# Patient Record
Sex: FEMALE | Race: WHITE | Employment: OTHER | URBAN - METROPOLITAN AREA
[De-identification: names, ages, dates, MRNs, and addresses within clinical notes are randomized per-mention and may not be internally consistent; named-entity substitution may affect disease eponyms.]

---

## 2019-06-18 ENCOUNTER — HOSPITAL ENCOUNTER (OUTPATIENT)
Age: 54
Setting detail: OUTPATIENT SURGERY
Discharge: HOME | End: 2019-06-18
Attending: SPECIALIST | Admitting: SPECIALIST
Payer: COMMERCIAL

## 2019-06-18 ENCOUNTER — ANESTHESIA (OUTPATIENT)
Dept: ENDOSCOPY | Age: 54
End: 2019-06-18
Payer: COMMERCIAL

## 2019-06-18 ENCOUNTER — ANESTHESIA EVENT (OUTPATIENT)
Dept: ENDOSCOPY | Age: 54
End: 2019-06-18
Payer: COMMERCIAL

## 2019-06-18 VITALS
HEIGHT: 62 IN | RESPIRATION RATE: 11 BRPM | TEMPERATURE: 97.7 F | SYSTOLIC BLOOD PRESSURE: 143 MMHG | BODY MASS INDEX: 42.23 KG/M2 | DIASTOLIC BLOOD PRESSURE: 90 MMHG | WEIGHT: 229.5 LBS | OXYGEN SATURATION: 100 % | HEART RATE: 61 BPM

## 2019-06-18 PROCEDURE — 76040000019: Performed by: SPECIALIST

## 2019-06-18 PROCEDURE — 74011250636 HC RX REV CODE- 250/636: Performed by: PHYSICIAN ASSISTANT

## 2019-06-18 PROCEDURE — 77030013992 HC SNR POLYP ENDOSC BSC -B: Performed by: SPECIALIST

## 2019-06-18 PROCEDURE — 76060000031 HC ANESTHESIA FIRST 0.5 HR: Performed by: SPECIALIST

## 2019-06-18 PROCEDURE — 88305 TISSUE EXAM BY PATHOLOGIST: CPT

## 2019-06-18 PROCEDURE — 74011250636 HC RX REV CODE- 250/636

## 2019-06-18 RX ORDER — DEXTROMETHORPHAN/PSEUDOEPHED 2.5-7.5/.8
1.2 DROPS ORAL
Status: DISCONTINUED | OUTPATIENT
Start: 2019-06-18 | End: 2019-06-19 | Stop reason: HOSPADM

## 2019-06-18 RX ORDER — PROPOFOL 10 MG/ML
INJECTION, EMULSION INTRAVENOUS AS NEEDED
Status: DISCONTINUED | OUTPATIENT
Start: 2019-06-18 | End: 2019-06-18 | Stop reason: HOSPADM

## 2019-06-18 RX ORDER — LIDOCAINE HYDROCHLORIDE 20 MG/ML
INJECTION, SOLUTION EPIDURAL; INFILTRATION; INTRACAUDAL; PERINEURAL AS NEEDED
Status: DISCONTINUED | OUTPATIENT
Start: 2019-06-18 | End: 2019-06-18 | Stop reason: HOSPADM

## 2019-06-18 RX ORDER — SODIUM CHLORIDE 9 MG/ML
50 INJECTION, SOLUTION INTRAVENOUS CONTINUOUS
Status: DISCONTINUED | OUTPATIENT
Start: 2019-06-18 | End: 2019-06-18 | Stop reason: HOSPADM

## 2019-06-18 RX ORDER — MIDAZOLAM HYDROCHLORIDE 1 MG/ML
.25-5 INJECTION, SOLUTION INTRAMUSCULAR; INTRAVENOUS AS NEEDED
Status: DISCONTINUED | OUTPATIENT
Start: 2019-06-18 | End: 2019-06-19 | Stop reason: HOSPADM

## 2019-06-18 RX ORDER — MONTELUKAST SODIUM 10 MG/1
TABLET ORAL DAILY
COMMUNITY

## 2019-06-18 RX ORDER — ATROPINE SULFATE 0.1 MG/ML
0.5 INJECTION INTRAVENOUS
Status: DISCONTINUED | OUTPATIENT
Start: 2019-06-18 | End: 2019-06-19 | Stop reason: HOSPADM

## 2019-06-18 RX ORDER — FLUMAZENIL 0.1 MG/ML
0.2 INJECTION INTRAVENOUS
Status: DISCONTINUED | OUTPATIENT
Start: 2019-06-18 | End: 2019-06-18 | Stop reason: HOSPADM

## 2019-06-18 RX ORDER — NALOXONE HYDROCHLORIDE 0.4 MG/ML
0.4 INJECTION, SOLUTION INTRAMUSCULAR; INTRAVENOUS; SUBCUTANEOUS
Status: DISCONTINUED | OUTPATIENT
Start: 2019-06-18 | End: 2019-06-18 | Stop reason: HOSPADM

## 2019-06-18 RX ORDER — PROPOFOL 10 MG/ML
INJECTION, EMULSION INTRAVENOUS
Status: DISCONTINUED | OUTPATIENT
Start: 2019-06-18 | End: 2019-06-18 | Stop reason: HOSPADM

## 2019-06-18 RX ORDER — EPINEPHRINE 0.1 MG/ML
1 INJECTION INTRACARDIAC; INTRAVENOUS
Status: DISCONTINUED | OUTPATIENT
Start: 2019-06-18 | End: 2019-06-19 | Stop reason: HOSPADM

## 2019-06-18 RX ORDER — FENTANYL CITRATE 50 UG/ML
25 INJECTION, SOLUTION INTRAMUSCULAR; INTRAVENOUS AS NEEDED
Status: DISCONTINUED | OUTPATIENT
Start: 2019-06-18 | End: 2019-06-19 | Stop reason: HOSPADM

## 2019-06-18 RX ADMIN — PROPOFOL 120 MCG/KG/MIN: 10 INJECTION, EMULSION INTRAVENOUS at 10:27

## 2019-06-18 RX ADMIN — PROPOFOL 40 MG: 10 INJECTION, EMULSION INTRAVENOUS at 10:32

## 2019-06-18 RX ADMIN — SODIUM CHLORIDE: 9 INJECTION, SOLUTION INTRAVENOUS at 10:25

## 2019-06-18 RX ADMIN — LIDOCAINE HYDROCHLORIDE 30 MG: 20 INJECTION, SOLUTION EPIDURAL; INFILTRATION; INTRACAUDAL; PERINEURAL at 10:28

## 2019-06-18 RX ADMIN — PROPOFOL 100 MG: 10 INJECTION, EMULSION INTRAVENOUS at 10:28

## 2019-06-18 RX ADMIN — PROPOFOL 30 MG: 10 INJECTION, EMULSION INTRAVENOUS at 10:34

## 2019-06-18 RX ADMIN — PROPOFOL 30 MG: 10 INJECTION, EMULSION INTRAVENOUS at 10:31

## 2019-06-18 NOTE — PERIOP NOTES
Received Report From Ton Armendariz CRNA @ [id number], see anesthesia notes. Care of the patient transferred to procedure nurse Skylar Farley RN @ [phone]    Out of Procedure and sent to post-recovery @ [phone] report given to Santa Morrow RN @ 2901    Patient ABD remains soft and non-tender post procedure. Pt has no complaints at this time and tolerated the procedure well. Endoscope was pre-cleaned at bedside immediately following procedure by Gisele Marshall.

## 2019-06-18 NOTE — ANESTHESIA PREPROCEDURE EVALUATION
Relevant Problems   No relevant active problems       Anesthetic History   No history of anesthetic complications            Review of Systems / Medical History  Patient summary reviewed and pertinent labs reviewed    Pulmonary        Sleep apnea: CPAP    Asthma     Comments: Seasonal allergies   Neuro/Psych   Within defined limits           Cardiovascular                  Exercise tolerance: >4 METS     GI/Hepatic/Renal  Within defined limits              Endo/Other        Morbid obesity and arthritis     Other Findings              Physical Exam    Airway  Mallampati: II  TM Distance: 4 - 6 cm  Neck ROM: normal range of motion   Mouth opening: Normal     Cardiovascular    Rhythm: regular  Rate: normal         Dental    Dentition: Lower dentition intact and Upper partial plate     Pulmonary  Breath sounds clear to auscultation               Abdominal         Other Findings            Anesthetic Plan    ASA: 2  Anesthesia type: MAC          Induction: Intravenous  Anesthetic plan and risks discussed with: Patient

## 2019-06-18 NOTE — ANESTHESIA POSTPROCEDURE EVALUATION
Procedure(s):  COLONOSCOPY  ENDOSCOPIC POLYPECTOMY. MAC    Anesthesia Post Evaluation      Multimodal analgesia: multimodal analgesia used between 6 hours prior to anesthesia start to PACU discharge  Patient location during evaluation: bedside  Patient participation: complete - patient participated  Level of consciousness: awake  Pain management: adequate  Airway patency: patent  Anesthetic complications: no  Cardiovascular status: acceptable  Respiratory status: acceptable  Hydration status: acceptable        Vitals Value Taken Time   /90 6/18/2019 11:09 AM   Temp     Pulse 61 6/18/2019 11:09 AM   Resp 11 6/18/2019 11:09 AM   SpO2 100 % 6/18/2019 11:09 AM   Vitals shown include unvalidated device data.

## 2019-06-18 NOTE — ROUTINE PROCESS
Anabella Zaragoza  1965  [id number]    Situation:  Verbal report received from: Faith Taylor RN  Procedure: Procedure(s):  COLONOSCOPY  ENDOSCOPIC POLYPECTOMY    Background:    Preoperative diagnosis: FAM HX COLON CANCER  Postoperative diagnosis: Ascending colon polyps  descending colon polyp  sigmoid polyps  diverticulosis    :  Dr. Milton Menjivar  Assistant(s): Endoscopy Technician-1: Quinn De Los Santos  Endoscopy RN-1: Denise Rivas RN    Specimens:   ID Type Source Tests Collected by Time Destination   1 : Ascending colon polyps Preservative Colon, Ascending  Amadeo Almanzar MD 6/18/2019 1036 Pathology   2 : Descending colon polyp Preservative Colon, Descending  Amadeo Almanzar MD 6/18/2019 1040 Pathology   3 : Sigmoid polyps Preservative Sigmoid  Amadeo Almanzar MD 6/18/2019 1041 Pathology     H. Pylori  no    Assessment:  Intra-procedure medications   Anesthesia gave intra-procedure sedation and medications, see anesthesia flow sheet yes    Intravenous fluids: NS@ KVO     Vital signs stable     Abdominal assessment: round and soft     Recommendation:  Discharge patient per MD order.   Family or Friend   Permission to share finding with family or friend yes

## 2019-06-18 NOTE — DISCHARGE INSTRUCTIONS
[address] [facility] KULDIP Haro MD  [phone]      June 18, 2019    Kevin Wu  YOB: 1965    COLONOSCOPY DISCHARGE INSTRUCTIONS    If there is redness at IV site you should apply warm compress to area. If redness or soreness persist contact Dr. Adama Haro' or your primary care doctor. There may be a slight amount of blood passed from the rectum. Gaseous discomfort may develop, but walking, belching will help relieve this. You may not operate a vehicle for 12 hours  You may not operate machinery or dangerous appliances for rest of today  You may not drink alcoholic beverages for 12 hours  Avoid making any critical decisions for 24 hours    DIET:  You may resume your normal diet, but some patients find that heavy or large meals may lead to indigestion or vomiting. I suggest a light meal as first food intake. MEDICATIONS:  The use of some over-the-counter pain medication may lead to bleeding after colon biopsies or polyp removal.  Tylenol (also called acetaminophen) is safe to take even if you have had colonoscopy with polyp removal.  Based on the procedure you had today you may not safely take aspirin or aspirin-like products for the next ten (10) days. Remember that Tylenol (also called acetaminophen) is safe to take after colonoscopy even if you have had biopsies or polyps removed. ACTIVITY:  You may resume your normal household activities, but it is recommended that you spend the remainder of the day resting -  avoid any strenuous activity.     CALL DR. Yisel Babcock' OFFICE IF:  Increasing pain, nausea, vomiting  Abdominal distension (swelling)  Significant new or increased bleeding (oral or rectal)  Fever/Chills  Chest pain/shortness of breath                       Additional instructions:   No aspirin 10 days  We found several polyps and removed them all  I will contact you with the polyp results when available. It was an honor to be your doctor today. Please let me or my office staff know if you have any feedback about today's procedure. Jet Cramer MD    Colonoscopy saves lives, and can prevent colon cancer. Everyone aged 48 or older needs colonoscopy.   Tell your family and friends: get the test!

## 2019-06-18 NOTE — H&P
Date: 2019 10:45 AM   Patient Name: Almas Trevizo   Account #: [id number]    Gender: Female    (age): 1965 (48)       Provider:     Zari Staples. Milton Menjivar MD        Referring Physician:     Roberto Davalos  [address] [place] [address] Bob Seals [address]  [phone] (phone)  [phone] (fax)        Chief Complaint: Do I have Blackwell syndrome? History of Present Illness:   Strong family history of colon cancer  Both parents  of colon cancer  Her sister diagnosed with advanced colon cancer at age 54  Her nephew diagnosed with advanced colon cancer at age 36  She had colonoscopy several years ago and had 3 advanced adenomas  There is also breast cancer in the family but I did not map out that part of the ananda. She meets Chandler criteria for [employer]. ? Strong family history of colon cancer  Both parents  of colon cancer  Her sister diagnosed with advanced colon cancer at age 54  Her nephew diagnosed with advanced colon cancer at age 36  She had colonoscopy several years ago and had 3 advanced adenomas  There is also breast cancer in the family but I did not map out that part of the ananda. She meets Chandler criteria for [employer]. ?       Past Medical History      Medical Conditions: Arthritis  Asthma  Raynauds Disease  Sleep apnea   Surgical Procedures: right hand  Appendectomy  Arthroscopy right knee  Tonsillectomy   Dx Studies: Colonoscopy, 2015, Moderate diverticulosis of the sigmoid colon, Polyp (3 mm) in the hepatic flexure. (Polypectomy) and Polyp (3 mm) in the distal sigmoid colon.  (Polypectomy)  Colonoscopy,   Endo Posting, 3/15/2019  MRI  Pre-Procedure Call, 2014   Medications: alprazolam 0.5 mg TAKE 1 TABLET BY MOUTH DAILY AS NEEDED FOR ANXIETY  hydroxychloroquine 200 mg  mometasone 50 mcg/actuation SPRAY 2 SPRAYS IN EACH NOSTRIL ONCE DAILY  montelukast 10 mg TAKE 1 TABLET BY MOUTH AT BEDTIME  Symbicort 160-4.5 mcg/actuation TAKE 2 PUFFS BY MOUTH TWICE A DAY   Allergies: Biaxin - jittery  keflex - jittery   Immunizations: No Immunizations      Social History      Alcohol: None   Tobacco: Never smoker   Drugs: None   Exercise: Walking 1 times a week. Caffeine: Daily. soda. Marital Status:          Occupation:               Family History No history of Colon Polyps, Esophogeal Cancer, GI Cancers, IBD (Crohn's or UC), Liver disease  Mother: Diagnosed with Family history of colon cancer;   Sister: Diagnosed with Family history of colon cancer; Father: Diagnosed with Family history of colon cancer;       Review of Systems:   Cardiovascular: Presents suffers from peripheral edema. Denies chest pain, irregular heart beat, palpitations, syncope, Sweats. Constitutional: Denies fatigue, fever, loss of appetite, weight gain, weight loss. ENMT: Denies nose bleeds, sore throat, hearing loss. Endocrine: Denies excessive thirst, heat intolerance. Eyes: Denies loss of vision. Gastrointestinal: Denies abdominal pain, abdominal swelling, change in bowel habits, constipation, diarrhea, Bloating/gas, heartburn, jaundice, nausea, rectal bleeding, stomach cramps, vomiting, dysphagia, rectal pain, Stool incontinence, hematemesis. Genitourinary: Denies dark urine, dysuria, frequent urination, hematuria, incontinence. Hematologic/Lymphatic: Denies easy bruising, prolonged bleeding. Integumentary: Presents suffers from itching. Denies rashes, sun sensitivity. Musculoskeletal: Presents suffers from arthritis. Denies back pain, gout, joint pain, muscle weakness, stiffness. Neurological: Denies dizziness, fainting, frequent headaches, memory loss. Psychiatric: Denies anxiety, depression, difficulty sleeping, hallucinations, nervousness, panic attacks, paranoia. Respiratory: Denies cough, dyspnea, wheezing.       Vital Signs:   BP  (mmHg)  Pulse  (ppm) Rhythm Weight (lbs/oz) Height (ft/in) BMI Resp/min Temp   146/90 70 Regular 228 /  5 / 4 39.13 12 97.9 (F) Physical Exam:   Constitutional:      Appearance: No distress, appears comfortable. Communication: Understands/receives spoken information. Skin:      Inspection: No rash, no jaundice. Head/face: Inspection: Normacephalic, atraumatic. Eyes:      Conjunctivae/lids: Normal.   Pupils/irises: Pupils equal, round and normal.   ENMT:      External: Normal.   Hearing: Normal.   Neck:      Neck: Normal appearance, trachea midline. Jugular veins: No JVD noted. Respiratory:      Effort: Normal respiratory effort, comfortable, speaks in complete sentences. Musculoskeletal:      Gait/station: normal.   Digits/nails: Normal, no spooning of nails, clubbing, or splinter hemorrhages, no clubbing, cyanosis, petechiae or other inflammatory conditions. Psychiatric:      Judgment/insight: Normal, normal judgement, normal insight. Orientation: oriented to time, space and person. Lab Results: No Electronic Results      Impressions: Family history of colon cancer. Both parents a sister and the sister's son all . ? ? Family history of colon cancer. Both parents a sister and the sister's son all . ? Assessment: ?         Plan: Colonoscopy    Please arrange [facility] \"MyRisk\" genetic testing. ? ? Colonoscopy? ?  ?  ? ? Please arrange Myriad \"MyRisk\" genetic testing. ? Risk & Medical Necessity: The patient requires Moderate Severity care for this visit. Diagnosis and management options are Minimal. The amount of data reviewed and/or ordered is Minimal/None. The level of risk is Minimal.           Notes:              Sanjay Lyles MD     Electronically signed on 2019 11:39:08 AM by Sanjay Joyner. Marly Lyles, [address]  Nikky Dominguez, MRN [id number],  1965 First Visit, Monday, May 6, 2019 New     Modify          Delete     Delete all     Edit Wording          Sign     [email or web address]         Pre-Endoscopy H&P Update  Chief complaint/HPI/ROS:  The indication for the procedure, the patient's history and the patient's current medications are reviewed prior to the procedure and that data is reported on the H&P to which this document is attached. Any significant complaints with regard to organ systems will be noted, and if not mentioned then a review of systems is not contributory. Past Medical History:   Diagnosis Date    Adverse effect of anesthesia     itching    Arthritis     Asthma     Sleep apnea     c-pap at home      Past Surgical History:   Procedure Laterality Date    HX APPENDECTOMY      HX  SECTION      X3     HX CHOLECYSTECTOMY      HX OOPHORECTOMY  2003    HX ORTHOPAEDIC      right knee surgery meniscus tear    HX ORTHOPAEDIC      right hand surgery for joint     HX OTHER SURGICAL      gallstone removed from bile duct     HX TONSILLECTOMY       Social   Social History     Tobacco Use    Smoking status: Never Smoker    Smokeless tobacco: Never Used   Substance Use Topics    Alcohol use: Never     Frequency: Never      History reviewed. No pertinent family history. Allergies   Allergen Reactions    Biaxin [Clarithromycin] Hives    Keflex [Cephalexin] Rash      Prior to Admission Medications   Prescriptions Last Dose Informant Patient Reported? Taking? VITAMIN E ACETATE PO   Yes Yes   Sig: Take  by mouth. albuterol sulfate (PROAIR HFA IN)   Yes Yes   Sig: Take  by inhalation. budesonide/formoterol fumarate (SYMBICORT IN)   Yes Yes   Sig: Take  by inhalation. fexofenadine HCl (ALLEGRA PO)   Yes Yes   Sig: Take  by mouth.   montelukast sodium (SINGULAIR PO)   Yes Yes   Sig: Take  by mouth. Facility-Administered Medications: None       PHYSICAL EXAM:  The patient is examined immediately prior to the procedure.   Visit Vitals  /74   Pulse 67   Temp 98.3 °F (36.8 °C)   Resp 13   Ht 5' 2\" (1.575 m)   Wt 104.1 kg (229 lb 8 oz)   SpO2 100%   Breastfeeding? No   BMI 41.98 kg/m²     Gen: Appears comfortable, no distress. Pulm: comfortable respirations with no abnormal audible breath sounds  HEART: well perfused, no abnormal audible heart sounds  GI: abdomen flat. PLAN:  Informed consent discussion held, patient afforded an opportunity to ask questions and all questions answered. After being advised of the risks, benefits, and alternatives, the patient requested that we proceed and indicated so on a written consent form. Will proceed with procedure as planned.   Jet Cramer MD

## 2019-06-18 NOTE — PROCEDURES
[address] [facility] KULDIP Chanel MD  [phone]      2019    Colonoscopy Procedure Note  Wilma Zepeda  :  1965  [place] Medical Record Number: [id number]    Indications:     Screening colonoscopy  PCP:  EVI Urban  Anesthesia/Sedation: Conscious Sedation/Moderate Sedation/GETA, see notes  Endoscopist:  Dr. Miguel Cool  Complications:  None  Estimated Blood Loss:  None    Permit:  The indications, risks, benefits and alternatives were reviewed with the patient or their decision maker who was provided an opportunity to ask questions and all questions were answered. The specific risks of colonoscopy with conscious sedation were reviewed, including but not limited to anesthetic complication, bleeding, adverse drug reaction, missed lesion, infection, IV site reactions, and intestinal perforation which would lead to the need for surgical repair. Alternatives to colonoscopy including radiographic imaging, observation without testing, or laboratory testing were reviewed including the limitations of those alternatives. After considering the options and having all their questions answered, the patient or their decision maker provided both verbal and written consent to proceed. Procedure in Detail:  After obtaining informed consent, positioning of the patient in the left lateral decubitus position, and conduction of a pre-procedure pause or \"time out\" the endoscope was introduced into the anus and advanced to the cecum, which was identified by the ileocecal valve and appendiceal orifice. The quality of the colonic preparation was good. A careful inspection was made as the colonoscope was withdrawn, findings and interventions are described below. Findings:   Five polyps in the colon today. Two ascending 6mm 4mm, one in descending 5mm, and two in sigmoid 6mm 5mm.   All taken with cold snare and hemostasis confirmed and samples retrieved. There is diverticulosis in the sigmoid colon without complications such as bleeding, inflammatory change, or luminal narrowing. Specimens:    See above    Complications:   None; patient tolerated the procedure well. Impression:  Multiple colon polyps in a paitent whose ananda meets Am[place] criteria for Blackwell syndrome but genetic testing does not reveal a known mutation consistent. Recommendations:     - Await pathology. - I still maintain this patient should have annual colonoscopy given the entirety of her dataset. Thank you for entrusting me with this patient's care. Please do not hesitate to contact me with any questions or if I can be of assistance with any of your other patients' GI needs. Signed By: Fazal Panda MD                        June 18, 2019      Surgical assistant none. Implants none unless specified.

## 2021-06-12 ENCOUNTER — TRANSCRIBE ORDER (OUTPATIENT)
Dept: REGISTRATION | Age: 56
End: 2021-06-12

## 2021-06-12 ENCOUNTER — HOSPITAL ENCOUNTER (OUTPATIENT)
Dept: LAB | Age: 56
Discharge: HOME | End: 2021-06-12
Payer: MEDICAID

## 2021-06-12 DIAGNOSIS — U07.1 COVID-19: Primary | ICD-10-CM

## 2021-06-12 DIAGNOSIS — U07.1 COVID-19: ICD-10-CM

## 2021-06-12 PROCEDURE — U0005 INFEC AGEN DETEC AMPLI PROBE: HCPCS

## 2021-06-14 LAB
SARS-COV-2, XPLCVT: NOT DETECTED
SOURCE, COVRS: NORMAL

## 2021-06-15 ENCOUNTER — ANESTHESIA (OUTPATIENT)
Dept: ENDOSCOPY | Age: 56
End: 2021-06-15
Payer: MEDICAID

## 2021-06-15 ENCOUNTER — ANESTHESIA EVENT (OUTPATIENT)
Dept: ENDOSCOPY | Age: 56
End: 2021-06-15
Payer: MEDICAID

## 2021-06-15 ENCOUNTER — HOSPITAL ENCOUNTER (OUTPATIENT)
Age: 56
Setting detail: OUTPATIENT SURGERY
Discharge: HOME | End: 2021-06-15
Attending: SPECIALIST | Admitting: SPECIALIST
Payer: MEDICAID

## 2021-06-15 VITALS
HEART RATE: 67 BPM | HEIGHT: 62 IN | OXYGEN SATURATION: 100 % | RESPIRATION RATE: 17 BRPM | WEIGHT: 240.08 LBS | BODY MASS INDEX: 44.18 KG/M2 | SYSTOLIC BLOOD PRESSURE: 148 MMHG | DIASTOLIC BLOOD PRESSURE: 68 MMHG | TEMPERATURE: 97.9 F

## 2021-06-15 PROCEDURE — 74011000250 HC RX REV CODE- 250: Performed by: NURSE ANESTHETIST, CERTIFIED REGISTERED

## 2021-06-15 PROCEDURE — 74011250636 HC RX REV CODE- 250/636: Performed by: NURSE ANESTHETIST, CERTIFIED REGISTERED

## 2021-06-15 PROCEDURE — 76060000031 HC ANESTHESIA FIRST 0.5 HR: Performed by: SPECIALIST

## 2021-06-15 PROCEDURE — 88305 TISSUE EXAM BY PATHOLOGIST: CPT

## 2021-06-15 PROCEDURE — 76040000019: Performed by: SPECIALIST

## 2021-06-15 PROCEDURE — 77030013992 HC SNR POLYP ENDOSC BSC -B: Performed by: SPECIALIST

## 2021-06-15 PROCEDURE — 2709999900 HC NON-CHARGEABLE SUPPLY: Performed by: SPECIALIST

## 2021-06-15 RX ORDER — HYDROXYCHLOROQUINE SULFATE 200 MG/1
200 TABLET, FILM COATED ORAL 2 TIMES DAILY
COMMUNITY

## 2021-06-15 RX ORDER — SODIUM CHLORIDE 9 MG/ML
50 INJECTION, SOLUTION INTRAVENOUS CONTINUOUS
Status: DISCONTINUED | OUTPATIENT
Start: 2021-06-15 | End: 2021-06-15 | Stop reason: HOSPADM

## 2021-06-15 RX ORDER — PROPOFOL 10 MG/ML
INJECTION, EMULSION INTRAVENOUS
Status: DISCONTINUED | OUTPATIENT
Start: 2021-06-15 | End: 2021-06-15 | Stop reason: HOSPADM

## 2021-06-15 RX ORDER — PREDNISONE 5 MG/1
5 TABLET ORAL 3 TIMES DAILY
COMMUNITY

## 2021-06-15 RX ORDER — NALOXONE HYDROCHLORIDE 0.4 MG/ML
0.4 INJECTION, SOLUTION INTRAMUSCULAR; INTRAVENOUS; SUBCUTANEOUS
Status: DISCONTINUED | OUTPATIENT
Start: 2021-06-15 | End: 2021-06-15 | Stop reason: HOSPADM

## 2021-06-15 RX ORDER — FEXOFENADINE HCL AND PSEUDOEPHEDRINE HCI 60; 120 MG/1; MG/1
1 TABLET, EXTENDED RELEASE ORAL DAILY
COMMUNITY

## 2021-06-15 RX ORDER — CHOLECALCIFEROL TAB 125 MCG (5000 UNIT) 125 MCG
5000 TAB ORAL DAILY
COMMUNITY

## 2021-06-15 RX ORDER — SERTRALINE HYDROCHLORIDE 50 MG/1
TABLET, FILM COATED ORAL DAILY
COMMUNITY

## 2021-06-15 RX ORDER — FLUMAZENIL 0.1 MG/ML
0.2 INJECTION INTRAVENOUS
Status: DISCONTINUED | OUTPATIENT
Start: 2021-06-15 | End: 2021-06-15 | Stop reason: HOSPADM

## 2021-06-15 RX ORDER — ALPRAZOLAM 0.5 MG/1
0.5 TABLET ORAL DAILY
COMMUNITY

## 2021-06-15 RX ORDER — PROPOFOL 10 MG/ML
INJECTION, EMULSION INTRAVENOUS AS NEEDED
Status: DISCONTINUED | OUTPATIENT
Start: 2021-06-15 | End: 2021-06-15 | Stop reason: HOSPADM

## 2021-06-15 RX ORDER — DEXTROMETHORPHAN/PSEUDOEPHED 2.5-7.5/.8
1.2 DROPS ORAL
Status: DISCONTINUED | OUTPATIENT
Start: 2021-06-15 | End: 2021-06-15 | Stop reason: HOSPADM

## 2021-06-15 RX ORDER — FENTANYL CITRATE 50 UG/ML
25 INJECTION, SOLUTION INTRAMUSCULAR; INTRAVENOUS AS NEEDED
Status: DISCONTINUED | OUTPATIENT
Start: 2021-06-15 | End: 2021-06-15 | Stop reason: HOSPADM

## 2021-06-15 RX ORDER — LIDOCAINE HYDROCHLORIDE 20 MG/ML
INJECTION, SOLUTION EPIDURAL; INFILTRATION; INTRACAUDAL; PERINEURAL AS NEEDED
Status: DISCONTINUED | OUTPATIENT
Start: 2021-06-15 | End: 2021-06-15 | Stop reason: HOSPADM

## 2021-06-15 RX ORDER — MOMETASONE FUROATE 50 UG/1
2 SPRAY, METERED NASAL DAILY
COMMUNITY

## 2021-06-15 RX ORDER — MIDAZOLAM HYDROCHLORIDE 1 MG/ML
.25-5 INJECTION, SOLUTION INTRAMUSCULAR; INTRAVENOUS AS NEEDED
Status: DISCONTINUED | OUTPATIENT
Start: 2021-06-15 | End: 2021-06-15 | Stop reason: HOSPADM

## 2021-06-15 RX ORDER — SODIUM CHLORIDE 9 MG/ML
INJECTION, SOLUTION INTRAVENOUS
Status: DISCONTINUED | OUTPATIENT
Start: 2021-06-15 | End: 2021-06-15 | Stop reason: HOSPADM

## 2021-06-15 RX ADMIN — PROPOFOL INJECTABLE EMULSION 100 MCG/KG/MIN: 10 INJECTION, EMULSION INTRAVENOUS at 11:03

## 2021-06-15 RX ADMIN — SODIUM CHLORIDE: 9 INJECTION, SOLUTION INTRAVENOUS at 10:30

## 2021-06-15 RX ADMIN — PROPOFOL INJECTABLE EMULSION 40 MG: 10 INJECTION, EMULSION INTRAVENOUS at 11:04

## 2021-06-15 RX ADMIN — PROPOFOL INJECTABLE EMULSION 20 MG: 10 INJECTION, EMULSION INTRAVENOUS at 11:05

## 2021-06-15 RX ADMIN — LIDOCAINE HYDROCHLORIDE 100 MG: 20 INJECTION, SOLUTION INTRAVENOUS at 11:03

## 2021-06-15 RX ADMIN — PROPOFOL INJECTABLE EMULSION 40 MG: 10 INJECTION, EMULSION INTRAVENOUS at 11:03

## 2021-06-15 NOTE — H&P
54 y.o. female for open access colonoscopy for screening   Additional data for completion of the targeted pre-endoscopy H&P will be provided under 'H&P interval notes'. Please see that document which will be attached to this. Luz Maria Bah MD    Family history meets Presto criteria for Blackwell syndrome but genetic testing negative for a known HNPCC variant/mutation. Last colonoscopy 2019 with numerous polyps.   Luz Maria Bah MD

## 2021-06-15 NOTE — ANESTHESIA PREPROCEDURE EVALUATION
Relevant Problems   No relevant active problems       Anesthetic History   No history of anesthetic complications            Review of Systems / Medical History  Patient summary reviewed and pertinent labs reviewed    Pulmonary        Sleep apnea    Asthma        Neuro/Psych   Within defined limits           Cardiovascular  Within defined limits                     GI/Hepatic/Renal     GERD           Endo/Other        Arthritis     Other Findings   Comments: RA on prednisone           Physical Exam    Airway  Mallampati: II      Mouth opening: Normal     Cardiovascular    Rhythm: regular  Rate: normal         Dental    Dentition: Full upper dentures     Pulmonary  Breath sounds clear to auscultation               Abdominal  GI exam deferred       Other Findings            Anesthetic Plan    ASA: 3  Anesthesia type: MAC          Induction: Intravenous  Anesthetic plan and risks discussed with: Patient

## 2021-06-15 NOTE — DISCHARGE INSTRUCTIONS
[address] [facility] KULDIP Leon MD  [phone]      Natacha 15, 2021    Olive Mariano  YOB: 1965    COLONOSCOPY DISCHARGE INSTRUCTIONS    If there is redness at IV site you should apply warm compress to area. If redness or soreness persist contact Dr. Yessica Leon' or your primary care doctor. There may be a slight amount of blood passed from the rectum. Gaseous discomfort may develop, but walking, belching will help relieve this. You may not operate a vehicle for 12 hours  You may not operate machinery or dangerous appliances for rest of today  You may not drink alcoholic beverages for 12 hours  Avoid making any critical decisions for 24 hours    DIET:  You may resume your normal diet, but some patients find that heavy or large meals may lead to indigestion or vomiting. I suggest a light meal as first food intake. MEDICATIONS:  The use of some over-the-counter pain medication may lead to bleeding after colon biopsies or polyp removal.  Tylenol (also called acetaminophen) is safe to take even if you have had colonoscopy with polyp removal.  Based on the procedure you had today you may not safely take aspirin or aspirin-like products for the next ten (10) days. Remember that Tylenol (also called acetaminophen) is safe to take after colonoscopy even if you have had biopsies or polyps removed. ACTIVITY:  You may resume your normal household activities, but it is recommended that you spend the remainder of the day resting -  avoid any strenuous activity. CALL DR. Val Ferrer' OFFICE IF:  Increasing pain, nausea, vomiting  Abdominal distension (swelling)  Significant new or increased bleeding (oral or rectal)  Fever/Chills  Chest pain/shortness of breath                       Additional instructions: We found and removed two polyps today, I'll reach out with the polyp results by letter in about a week.      It was an honor to be your doctor today. Please let me or my office staff know if you have any feedback about today's procedure. Luz Maria Bah MD    Colonoscopy saves lives, and can prevent colon cancer. Everyone aged 48 or older needs colonoscopy.   Tell your family and friends: get the test!

## 2021-06-15 NOTE — PROGRESS NOTES
Kimberly Martinez  1965  [id number]    Situation:  Verbal report received from: Patrick Pfeiffer RN  Procedure: Procedure(s):  COLONOSCOPY,EGD  ESOPHAGOGASTRODUODENOSCOPY (EGD)  ENDOSCOPIC POLYPECTOMY    Background:    Preoperative diagnosis: FAMILY HISTORY COLON CANCER, PERSONAL HISTORY COLON POLYPS, RIVAS SYNDROME  Postoperative diagnosis: Upper EGD exam normal.     :  Dr. Kelvin Judge  Assistant(s): Endoscopy Technician-1: Maldonado Miner  Endoscopy RN-1: Dc Pace RN    Specimens:   ID Type Source Tests Collected by Time Destination   1 : ascending polyp Preservative Colon, Ascending  Sandee Johnson MD 6/15/2021 1116 Pathology   2 : transverse polyp Preservative Colon, Transverse  Sandee Johnson MD 6/15/2021 1120 Pathology     H. Pylori  no    Assessment:  Intra-procedure medications   Anesthesia gave intra-procedure sedation and medications, see anesthesia flow sheet yes    Intravenous fluids: NS@ KVO     Vital signs stable yes    Abdominal assessment: round and soft yes    Recommendation:  Discharge patient per MD order yes.   Family or Friend spouse- Miranda Killings to share finding with family or friend yes

## 2021-06-15 NOTE — PERIOP NOTES
1104  Anesthesia staff at patient's bedside administering anesthesia and monitoring patients vital signs throughout procedure. See anesthesia note. Post procedure, report received from Jeovany KAT. 1125  Endoscope was pre-cleaned at bedside immediately following procedure by endo Ralph myles. 1129  Patient tolerated procedure. Abdomen soft and patient arousable and voices no complaints. Patient transported to endoscopy recovery area. Report given to post procedure RNWade.

## 2021-06-15 NOTE — INTERVAL H&P NOTE
Pre-Endoscopy H&P Update Chief complaint/HPI/ROS:  The indication for the procedure, the patient's history and the patient's current medications are reviewed prior to the procedure and that data is reported on the H&P to which this document is attached. Any significant complaints with regard to organ systems will be noted, and if not mentioned then a review of systems is not contributory. Past Medical History:  
Diagnosis Date Adverse effect of anesthesia   
 itching Arthritis   
 rhemetoid Asthma Autoimmune disease ([address]   
 rhumetoid GERD (gastroesophageal reflux disease) Psychiatric disorder   
 anxiety Seasonal allergic rhinitis Sleep apnea   
 c-pap at home Past Surgical History:  
Procedure Laterality Date COLONOSCOPY N/A 2019 COLONOSCOPY performed by Cornelio Aguirre MD at [address] [facility] HX  SECTION    
 X3 HX CHOLECYSTECTOMY HX OOPHORECTOMY   HX ORTHOPAEDIC    
 right knee surgery meniscus tear HX ORTHOPAEDIC Right 2020  
 right hand surgery for joint HX ORTHOPAEDIC Right 2021  
 rotory cuff repair HX OTHER SURGICAL    
 gallstone removed from bile duct HX TONSILLECTOMY Social  
Social History Tobacco Use Smoking status: Never Smoker Smokeless tobacco: Never Used Substance Use Topics Alcohol use: Never Family History Problem Relation Age of Onset Cancer Mother 61  
     colon Cancer Father   
     colon Cancer Sister 54  
     colon Allergies Allergen Reactions Biaxin [Clarithromycin] Hives Keflex [Cephalexin] Rash Prior to Admission Medications Prescriptions Last Dose Informant Patient Reported? Taking? ALPRAZolam (XANAX) 0.5 mg tablet 6/15/2021 at Unknown time  Yes Yes Sig: Take 0.5 mg by mouth daily.  Indications: anxious  
albuterol sulfate (PROAIR HFA IN) Unknown at Unknown time  Yes No  
Sig: Take 2 Puffs by inhalation daily as needed. Indications: asthma  
budesonide/formoterol fumarate (SYMBICORT IN) 6/15/2021 at Unknown time  Yes Yes Sig: Take 2 Puffs by inhalation daily. Indications: asthma  
cholecalciferol (Vitamin D3) (5000 Units/125 mcg) tab tablet 6/15/2021 at Unknown time  Yes Yes Sig: Take 5,000 Units by mouth daily. fexofenadine-pseudoephedrine (Allegra-D 12 Hour)  mg Tb12 6/15/2021 at Unknown time  Yes Yes Sig: Take 1 Tablet by mouth daily. hydrOXYchloroQUINE (PLAQUENIL) 200 mg tablet 6/15/2021 at Unknown time  Yes Yes Sig: Take 200 mg by mouth two (2) times a day. Indications: rheumatoid arthritis  
mometasone (Nasonex) 50 mcg/actuation nasal spray 6/15/2021 at Unknown time  Yes Yes Si Sprays daily. Indications: inflammation of the nose due to an allergy  
montelukast (Singulair) 10 mg tablet 6/15/2021 at Unknown time  Yes Yes Sig: Take  by mouth daily. predniSONE (DELTASONE) 5 mg tablet 6/15/2021 at Unknown time  Yes Yes Sig: Take 5 mg by mouth three (3) times daily. Indications: for 7 days then reduce dose  
sertraline (ZOLOFT) 50 mg tablet 6/15/2021 at Unknown time  Yes Yes Sig: Take  by mouth daily. Facility-Administered Medications: None PHYSICAL EXAM:  The patient is examined immediately prior to the procedure. Visit Vitals BP (!) 142/74 Pulse 66 Temp 98.7 °F (37.1 °C) Resp 23 Ht 5' 2\" (1.575 m) Wt 108.9 kg (240 lb 1.3 oz) SpO2 98% Breastfeeding No  
BMI 43.91 kg/m² Gen: Appears comfortable, no distress. Pulm: comfortable respirations with no abnormal audible breath sounds HEART: well perfused, no abnormal audible heart sounds GI: abdomen flat. PLAN:  Informed consent discussion held, patient afforded an opportunity to ask questions and all questions answered. After being advised of the risks, benefits, and alternatives, the patient requested that we proceed and indicated so on a written consent form. Will proceed with procedure as planned. Laura Galeas MD

## 2021-06-15 NOTE — PROGRESS NOTES
Endoscopy discharge instructions have been reviewed and given to patient. The patient verbalized understanding and acceptance of instructions. Dr. Satinder Trimble discussed with spouse procedure findings and next steps.

## 2021-06-15 NOTE — ANESTHESIA POSTPROCEDURE EVALUATION
Procedure(s):  COLONOSCOPY,EGD  ESOPHAGOGASTRODUODENOSCOPY (EGD)  ENDOSCOPIC POLYPECTOMY. MAC    Anesthesia Post Evaluation      Multimodal analgesia: multimodal analgesia not used between 6 hours prior to anesthesia start to PACU discharge  Patient location during evaluation: PACU  Patient participation: complete - patient participated  Level of consciousness: awake  Pain management: adequate  Airway patency: patent  Anesthetic complications: no  Cardiovascular status: acceptable, blood pressure returned to baseline and hemodynamically stable  Respiratory status: acceptable  Hydration status: acceptable  Post anesthesia nausea and vomiting:  controlled  Final Post Anesthesia Temperature Assessment:  Normothermia (36.0-37.5 degrees C)      INITIAL Post-op Vital signs:   Vitals Value Taken Time   /68 06/15/21 1152   Temp 36.6 °C (97.9 °F) 06/15/21 1132   Pulse 68 06/15/21 1153   Resp 25 06/15/21 1153   SpO2 100 % 06/15/21 1153   Vitals shown include unvalidated device data.

## 2021-06-15 NOTE — PROCEDURES
[address] [place], 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 Family history of coloretal cancer (screening only)   Referring Physician:  EVI Dent  Anesthesia/Sedation: Conscious Sedation/Moderate Sedation/MAC  Endoscopist:  Dr. Owen Jama  Complications:  None  Estimated Blood Loss:  None    Permit:  The indications, risks, benefits and alternatives were reviewed with the patient or their decision maker who was provided an opportunity to ask questions and all questions were answered. The specific risks of esophagogastroduodenoscopy with conscious sedation were reviewed, including but not limited to anesthetic complication, bleeding, adverse drug reaction, missed lesion, infection, IV site reactions, and intestinal perforation which would lead to the need for surgical repair. Alternatives to EGD and colonoscopy including radiographic imaging, observation without testing, or laboratory testing were reviewed as well as the limitations of those alternatives discussed. After considering the options and having all their questions answered, the patient or their decision maker provided both verbal and written consent to proceed. -----------EGD------------   Procedure in Detail:  After obtaining informed consent, positioning of the patient in the left lateral decubitus position, and conduction of a pre-procedure pause or \"time out\" the endoscope was introduced into the mouth and advanced to the duodenum. A careful inspection was made, and findings or interventions are described below.     Findings:   Esophagus:normal  Stomach: normal Duodenum/jejunum: normal        ----------Colonoscopy-----------    Procedure in Detail:  After obtaining informed consent, positioning of the patient in the left lateral decubitus position, and conduction of a pre-procedure pause or \"time out\" the endoscope was introduced into the anus and advanced to the cecum, which was identified by the ileocecal valve and appendiceal orifice. The quality of the colonic preparation was good. A careful inspection was made as the colonoscope was withdrawn, findings and interventions are described below. Findings:   Ascending polyp 6mm and transverse colon polyp 6mm removed with cold snare, retrieved, and hemostasis confirmed. There is diverticulosis in the sigmoid colon without complications such as bleeding, inflammatory change, or luminal narrowing.      ------------------------------  Specimens:    See above    Complications:   None; patient tolerated the procedure well. Impressions:  EGD:  Normal  Colonoscopy: Colon polyps, diverticulosis      Recommendations:     - Await pathology. Thank you for entrusting me with this patient's care. Please do not hesitate to contact me with any questions or if I can be of assistance with any of your other patients' GI needs. Signed By: Sparkle Villarreal MD                        Natacha 15, 2021    Surgical assistant none. Implants none unless specified.

## 2023-02-24 ENCOUNTER — ANESTHESIA (OUTPATIENT)
Dept: ENDOSCOPY | Age: 58
End: 2023-02-24
Payer: MEDICAID

## 2023-02-24 ENCOUNTER — HOSPITAL ENCOUNTER (OUTPATIENT)
Age: 58
Setting detail: OUTPATIENT SURGERY
Discharge: HOME | End: 2023-02-24
Attending: SPECIALIST | Admitting: SPECIALIST
Payer: MEDICAID

## 2023-02-24 ENCOUNTER — ANESTHESIA EVENT (OUTPATIENT)
Dept: ENDOSCOPY | Age: 58
End: 2023-02-24
Payer: MEDICAID

## 2023-02-24 VITALS
TEMPERATURE: 97.7 F | DIASTOLIC BLOOD PRESSURE: 99 MMHG | BODY MASS INDEX: 44.42 KG/M2 | SYSTOLIC BLOOD PRESSURE: 158 MMHG | HEART RATE: 63 BPM | HEIGHT: 62 IN | WEIGHT: 241.4 LBS | RESPIRATION RATE: 19 BRPM | OXYGEN SATURATION: 99 %

## 2023-02-24 PROCEDURE — 74011000250 HC RX REV CODE- 250: Performed by: NURSE ANESTHETIST, CERTIFIED REGISTERED

## 2023-02-24 PROCEDURE — 88305 TISSUE EXAM BY PATHOLOGIST: CPT

## 2023-02-24 PROCEDURE — 76040000019: Performed by: SPECIALIST

## 2023-02-24 PROCEDURE — 74011250636 HC RX REV CODE- 250/636: Performed by: NURSE ANESTHETIST, CERTIFIED REGISTERED

## 2023-02-24 PROCEDURE — 2709999900 HC NON-CHARGEABLE SUPPLY: Performed by: SPECIALIST

## 2023-02-24 PROCEDURE — 76060000031 HC ANESTHESIA FIRST 0.5 HR: Performed by: SPECIALIST

## 2023-02-24 PROCEDURE — 77030013992 HC SNR POLYP ENDOSC BSC -B: Performed by: SPECIALIST

## 2023-02-24 RX ORDER — SODIUM CHLORIDE 9 MG/ML
INJECTION, SOLUTION INTRAVENOUS
Status: DISCONTINUED | OUTPATIENT
Start: 2023-02-24 | End: 2023-02-24 | Stop reason: HOSPADM

## 2023-02-24 RX ORDER — SODIUM CHLORIDE 9 MG/ML
50 INJECTION, SOLUTION INTRAVENOUS CONTINUOUS
Status: DISCONTINUED | OUTPATIENT
Start: 2023-02-24 | End: 2023-02-24 | Stop reason: HOSPADM

## 2023-02-24 RX ORDER — FLUTICASONE PROPIONATE 50 MCG
2 SPRAY, SUSPENSION (ML) NASAL DAILY
COMMUNITY

## 2023-02-24 RX ORDER — FENTANYL CITRATE 50 UG/ML
25 INJECTION, SOLUTION INTRAMUSCULAR; INTRAVENOUS AS NEEDED
Status: DISCONTINUED | OUTPATIENT
Start: 2023-02-24 | End: 2023-02-24 | Stop reason: HOSPADM

## 2023-02-24 RX ORDER — FLUMAZENIL 0.1 MG/ML
0.2 INJECTION INTRAVENOUS
Status: DISCONTINUED | OUTPATIENT
Start: 2023-02-24 | End: 2023-02-24 | Stop reason: HOSPADM

## 2023-02-24 RX ORDER — DEXTROMETHORPHAN/PSEUDOEPHED 2.5-7.5/.8
1.2 DROPS ORAL
Status: DISCONTINUED | OUTPATIENT
Start: 2023-02-24 | End: 2023-02-24 | Stop reason: HOSPADM

## 2023-02-24 RX ORDER — LIDOCAINE HYDROCHLORIDE 20 MG/ML
INJECTION, SOLUTION EPIDURAL; INFILTRATION; INTRACAUDAL; PERINEURAL AS NEEDED
Status: DISCONTINUED | OUTPATIENT
Start: 2023-02-24 | End: 2023-02-24 | Stop reason: HOSPADM

## 2023-02-24 RX ORDER — PROPOFOL 10 MG/ML
INJECTION, EMULSION INTRAVENOUS
Status: DISCONTINUED | OUTPATIENT
Start: 2023-02-24 | End: 2023-02-24 | Stop reason: HOSPADM

## 2023-02-24 RX ORDER — NALOXONE HYDROCHLORIDE 0.4 MG/ML
0.4 INJECTION, SOLUTION INTRAMUSCULAR; INTRAVENOUS; SUBCUTANEOUS
Status: DISCONTINUED | OUTPATIENT
Start: 2023-02-24 | End: 2023-02-24 | Stop reason: HOSPADM

## 2023-02-24 RX ORDER — MIDAZOLAM HYDROCHLORIDE 1 MG/ML
.25-5 INJECTION, SOLUTION INTRAMUSCULAR; INTRAVENOUS AS NEEDED
Status: DISCONTINUED | OUTPATIENT
Start: 2023-02-24 | End: 2023-02-24 | Stop reason: HOSPADM

## 2023-02-24 RX ADMIN — LIDOCAINE HYDROCHLORIDE 100 MG: 20 INJECTION, SOLUTION EPIDURAL; INFILTRATION; INTRACAUDAL; PERINEURAL at 10:32

## 2023-02-24 RX ADMIN — PROPOFOL 500 MCG/KG/MIN: 10 INJECTION, EMULSION INTRAVENOUS at 10:32

## 2023-02-24 RX ADMIN — SODIUM CHLORIDE: 9 INJECTION, SOLUTION INTRAVENOUS at 10:16

## 2023-02-24 NOTE — PROCEDURES
[facility], [address] [place] [address]  [phone]      2023    Colonoscopy Procedure Note  Nicholeia Heading  :  1965  [facility] Medical Record Number: [id number]    Indications:     Screening colonoscopy, Strong family history of colon neoplasia, ananda meets Aultman criteria. PCP:  EVI Lacy  Anesthesia/Sedation: Conscious Sedation/Moderate Sedation/GETA, see notes  Endoscopist:  Dr. Alethea Vargas  Complications:  None  Estimated Blood Loss:  None    Permit:  The indications, risks, benefits and alternatives were reviewed with the patient or their decision maker who was provided an opportunity to ask questions and all questions were answered. The specific risks of colonoscopy with conscious sedation were reviewed, including but not limited to anesthetic complication, bleeding, adverse drug reaction, missed lesion, infection, IV site reactions, and intestinal perforation which would lead to the need for surgical repair. Alternatives to colonoscopy including radiographic imaging, observation without testing, or laboratory testing were reviewed including the limitations of those alternatives. After considering the options and having all their questions answered, the patient or their decision maker provided both verbal and written consent to proceed. Procedure in Detail:  After obtaining informed consent, positioning of the patient in the left lateral decubitus position, and conduction of a pre-procedure pause or \"time out\" the endoscope was introduced into the anus and advanced to the cecum, which was identified by the ileocecal valve and appendiceal orifice. The quality of the colonic preparation was good. A careful inspection was made as the colonoscope was withdrawn, findings and interventions are described below. Findings:    There is diverticulosis in the sigmoid colon without complications such as bleeding, inflammatory change, or luminal narrowing. There is a single ascending colon polyp removed with cold snare, retrieved, and hemostasis confirmed. Specimens:    See above    Complications:   None; patient tolerated the procedure well. Impression:  Colon polyp, diverticulosis. Recommendations:     - Await pathology. Thank you for entrusting me with this patient's care. Please do not hesitate to contact me with any questions or if I can be of assistance with any of your other patients' GI needs. Signed By: Wilmer Kilgore MD                        February 24, 2023      Surgical assistant none. Implants none unless specified.

## 2023-02-24 NOTE — ANESTHESIA PREPROCEDURE EVALUATION
Relevant Problems   No relevant active problems       Anesthetic History               Review of Systems / Medical History  Patient summary reviewed and pertinent labs reviewed    Pulmonary        Sleep apnea: CPAP    Asthma        Neuro/Psych         Psychiatric history     Cardiovascular                  Exercise tolerance: <4 METS     GI/Hepatic/Renal     GERD: well controlled           Endo/Other        Morbid obesity and arthritis     Other Findings              Physical Exam    Airway  Mallampati: II  TM Distance: < 4 cm         Cardiovascular  Regular rate and rhythm,  S1 and S2 normal,  no murmur, click, rub, or gallop             Dental    Dentition: Full upper dentures     Pulmonary  Breath sounds clear to auscultation               Abdominal         Other Findings            Anesthetic Plan    ASA: 3  Anesthesia type: MAC          Induction: Intravenous  Anesthetic plan and risks discussed with: Patient

## 2023-02-24 NOTE — PROGRESS NOTES
Endoscopy discharge instructions have been reviewed and given to patient. The patient verbalized understanding and acceptance of instructions. Dr. Roger Andres  discussed with patient procedure findings and next steps.

## 2023-02-24 NOTE — INTERVAL H&P NOTE
Pre-Endoscopy H&P Update  Chief complaint/HPI/ROS:  The indication for the procedure, the patient's history and the patient's current medications are reviewed prior to the procedure and that data is reported on the H&P to which this document is attached. Any significant complaints with regard to organ systems will be noted, and if not mentioned then a review of systems is not contributory. Past Medical History:   Diagnosis Date    Adverse effect of anesthesia     itching    Arthritis     rhemetoid    Asthma     Autoimmune disease ([address]     rhumetoid    GERD (gastroesophageal reflux disease)     Psychiatric disorder     anxiety    Seasonal allergic rhinitis     Sleep apnea     c-pap at home      Past Surgical History:   Procedure Laterality Date    COLONOSCOPY N/A 2019    COLONOSCOPY performed by Jae Edgar MD at [address] [place] [address] N/A 6/15/2021    COLONOSCOPY,EGD performed by Jae Edgar MD at [facility] ENDOSCOPY    HX APPENDECTOMY      HX  SECTION      X3     HX CHOLECYSTECTOMY      HX OOPHORECTOMY  2003    HX ORTHOPAEDIC      right knee surgery meniscus tear    HX ORTHOPAEDIC Right 2020    right hand surgery for joint     HX ORTHOPAEDIC Right 2021    rotory cuff repair    HX OTHER SURGICAL      gallstone removed from bile duct     HX TONSILLECTOMY       Social   Social History     Tobacco Use    Smoking status: Never    Smokeless tobacco: Never   Substance Use Topics    Alcohol use: Never      Family History   Problem Relation Age of Onset    Cancer Mother 61        colon    Cancer Father         colon    Cancer Sister 54        colon      Allergies   Allergen Reactions    Biaxin [Clarithromycin] Hives    Keflex [Cephalexin] Rash      Prior to Admission Medications   Prescriptions Last Dose Informant Patient Reported? Taking? ALPRAZolam (XANAX) 0.5 mg tablet 2023  Yes No   Sig: Take 0.5 mg by mouth daily.  Indications: anxious   albuterol sulfate (PROAIR HFA IN) Unknown  Yes No   Sig: Take 2 Puffs by inhalation daily as needed. Indications: asthma   budesonide/formoterol fumarate (SYMBICORT IN) 2023  Yes Yes   Sig: Take 2 Puffs by inhalation daily. Indications: asthma   cholecalciferol (VITAMIN D3) (5000 Units/125 mcg) tab tablet 2023  Yes Yes   Sig: Take 5,000 Units by mouth daily. fexofenadine-pseudoephedrine (ALLEGRA-D)  mg Tb12 2023  Yes Yes   Sig: Take 1 Tablet by mouth daily. fluticasone propionate (FLONASE) 50 mcg/actuation nasal spray 2023  Yes Yes   Si Sprays by Both Nostrils route daily. montelukast (SINGULAIR) 10 mg tablet 2023  Yes Yes   Sig: Take  by mouth daily. sertraline (ZOLOFT) 50 mg tablet 2023  Yes Yes   Sig: Take  by mouth daily. Facility-Administered Medications: None       PHYSICAL EXAM:  The patient is examined immediately prior to the procedure. Visit Vitals  BP (!) 157/76   Pulse 75   Temp 97.9 °F (36.6 °C)   Resp 21   Ht 5' 2\" (1.575 m)   Wt 109.5 kg (241 lb 6.5 oz)   SpO2 95%   Breastfeeding No   BMI 44.15 kg/m²     Gen: Appears comfortable, no distress. Pulm: comfortable respirations with no abnormal audible breath sounds  HEART: well perfused, no abnormal audible heart sounds  GI: abdomen flat. PLAN:  Informed consent discussion held, patient afforded an opportunity to ask questions and all questions answered. After being advised of the risks, benefits, and alternatives, the patient requested that we proceed and indicated so on a written consent form. Will proceed with procedure as planned.   Bryn Strickland MD

## 2023-02-24 NOTE — PROGRESS NOTES
Angela Abiel  1965  [id number]    Situation:  Verbal report received from:   Carlos Dias RN   Procedure: Procedure(s):  COLONOSCOPY  ENDOSCOPIC POLYPECTOMY    Background:    Preoperative diagnosis: Family history of colon cancer [Z80.0]  Personal history of colonic polyps [Z86.010]  Postoperative diagnosis: 1. diverticulosis  2.ascending colon polyp    :  Dr. Luke Cardenas  Assistant(s): Endoscopy Technician-1: Valentine Murray LPN  Endoscopy RN-1: Graham Blackburn RN    Specimens:   ID Type Source Tests Collected by Time Destination   1 : ascending colon polyp Preservative Colon, Ascending  Griffin Deleon MD 2/24/2023 1037 Pathology     H. Pylori  no    Assessment:  Intra-procedure medications     Anesthesia gave intra-procedure sedation and medications, see anesthesia flow sheet yes    Intravenous fluids: NS@ KVO     Vital signs stable   yes    Abdominal assessment: round and soft   yes    Recommendation:  Discharge patient per MD order  yes.   Return to floor  outpatient  Family or Friend     Permission to share finding with family or friend yes

## 2023-02-24 NOTE — PERIOP NOTES
Anesthesia staff at patient's bedside administering anesthesia and monitoring patients vital signs throughout procedure. See anesthesia note. Endoscope was pre-cleaned at bedside immediately following procedure by Wilda     Pt transported to recovery, placed on bedside cardiac monitor. VSS. Report given to PACU RN [facility] using SBAR format. Opportunity for questions provided, and answered.

## 2023-02-24 NOTE — DISCHARGE INSTRUCTIONS
[address] [facility] KULDIP Gee MD  [phone]      February 24, 2023    Denice Ball  YOB: 1965    COLONOSCOPY DISCHARGE INSTRUCTIONS    If there is redness at IV site you should apply warm compress to area. If redness or soreness persist contact Dr. Leanna Gee' or your primary care doctor. There may be a slight amount of blood passed from the rectum. Gaseous discomfort may develop, but walking, belching will help relieve this. You may not operate a vehicle for 12 hours  You may not operate machinery or dangerous appliances for rest of today  You may not drink alcoholic beverages for 12 hours  Avoid making any critical decisions for 24 hours    DIET:  You may resume your normal diet, but some patients find that heavy or large meals may lead to indigestion or vomiting. I suggest a light meal as first food intake. MEDICATIONS:  The use of some over-the-counter pain medication may lead to bleeding after colon biopsies or polyp removal.  Tylenol (also called acetaminophen) is safe to take even if you have had colonoscopy with polyp removal.  Based on the procedure you had today you may not safely take aspirin or aspirin-like products for the next ten (10) days. Remember that Tylenol (also called acetaminophen) is safe to take after colonoscopy even if you have had biopsies or polyps removed. ACTIVITY:  You may resume your normal household activities, but it is recommended that you spend the remainder of the day resting -  avoid any strenuous activity. CALL DR. Bertha Priest' OFFICE IF:  Increasing pain, nausea, vomiting  Abdominal distension (swelling)  Significant new or increased bleeding (oral or rectal)  Fever/Chills  Chest pain/shortness of breath                       Additional instructions:   No aspirin 10 days. We found and removed one small polyp today.   I'll contact you with the polyp results by letter in 2 weeks. It was an honor to be your doctor today. Please let me or my office staff know if you have any feedback about today's procedure. Clint Zepeda MD    Colonoscopy saves lives, and can prevent colon cancer. Everyone aged 48 or older needs colonoscopy.   Tell your family and friends: get the test!

## 2023-02-24 NOTE — H&P
For colon cancer screening in a patient with estimated higher than average risk for that disease based on family history and personal history of colon polyps - see below. Annemarie Vidal MD           Date: 2019 10:45 AM  Patient Name: Brigitte Parra  Account #: [id number]  Gender: Female   (age): 1965 (48)  Provider:   Ritu Urbina. Tereso Ganser, MD  Referring Physician:   Bobbi Lange  [address] [place] [address] Daljit Landry [address]  [phone] (phone)  [phone] (fax)  Chief Complaint:    Do I have Blackwell syndrome? History of Present Illness:  Strong family history of colon cancer  Both parents  of colon cancer  Her sister diagnosed with advanced colon cancer at age 54  Her nephew diagnosed with advanced colon cancer at age 36  She had colonoscopy several years ago and had 3 advanced adenomas  There is also breast cancer in the family but I did not map out that part of the ananda. She meets Nickerson criteria for [facility]. Past Medical History  Medical Conditions:   Arthritis  Asthma  Raynauds Disease  Sleep apnea  Surgical Procedures:   right hand  Appendectomy  Arthroscopy right knee  Tonsillectomy  Dx Studies:   Colonoscopy, 2015, Moderate diverticulosis of the sigmoid colon, Polyp (3 mm) in the hepatic flexure. (Polypectomy) and Polyp (3 mm) in the distal sigmoid colon.  (Polypectomy)  Colonoscopy,   Endo Posting, 3/15/2019  MRI  Pre-Procedure Call, 2014  Medications:   alprazolam 0.5 mg TAKE 1 TABLET BY MOUTH DAILY AS NEEDED FOR ANXIETY  hydroxychloroquine 200 mg  mometasone 50 mcg/actuation SPRAY 2 SPRAYS IN EACH NOSTRIL ONCE DAILY  montelukast 10 mg TAKE 1 TABLET BY MOUTH AT BEDTIME  Symbicort 160-4.5 mcg/actuation TAKE 2 PUFFS BY MOUTH TWICE A DAY  Allergies:   Biaxin - jittery  keflex - jittery  Immunizations:   No Immunizations  Social History  Alcohol:   None  Tobacco:   Never smoker  Drugs:   None  Exercise:   Walking 1 times a week.  Caffeine:   Daily.  soda. Marital Status:         Family History   No history of Colon Polyps, Esophogeal Cancer, GI Cancers, IBD (Crohn's or UC), Liver disease  Mother: Diagnosed with Family history of colon cancer;  Sister: Diagnosed with Family history of colon cancer; Father: Diagnosed with Family history of colon cancer;  Review of Systems:  Cardiovascular: Presents suffers from peripheral edema. Denies chest pain, irregular heart beat, palpitations, syncope, Sweats. Constitutional: Denies fatigue, fever, loss of appetite, weight gain, weight loss. ENMT: Denies nose bleeds, sore throat, hearing loss. Endocrine: Denies excessive thirst, heat intolerance. Eyes: Denies loss of vision. Gastrointestinal: Denies abdominal pain, abdominal swelling, change in bowel habits, constipation, diarrhea, Bloating/gas, heartburn, jaundice, nausea, rectal bleeding, stomach cramps, vomiting, dysphagia, rectal pain, Stool incontinence, hematemesis. Genitourinary: Denies dark urine, dysuria, frequent urination, hematuria, incontinence. Hematologic/Lymphatic: Denies easy bruising, prolonged bleeding. Integumentary: Presents suffers from itching. Denies rashes, sun sensitivity. Musculoskeletal: Presents suffers from arthritis. Denies back pain, gout, joint pain, muscle weakness, stiffness. Neurological: Denies dizziness, fainting, frequent headaches, memory loss. Psychiatric: Denies anxiety, depression, difficulty sleeping, hallucinations, nervousness, panic attacks, paranoia. Respiratory: Denies cough, dyspnea, wheezing. Vital Signs:  BP  (mmHg)  Pulse  (ppm) Rhythm Weight (lbs/oz) Height (ft/in) BMI Resp/min Temp  146/90 70 Regular 228 / 5 / 4 39.13 12 97.9 (F)  Physical Exam:  Constitutional:  Appearance: No distress, appears comfortable. Communication: Understands/receives spoken information. Skin:  Inspection: No rash, no jaundice. Head/face:   Inspection: Normacephalic, atraumatic. Eyes:  Conjunctivae/lids: Normal.  Pupils/irises: Pupils equal, round and normal.  ENMT:  External: Normal.  Hearing: Normal.  Neck:  Neck: Normal appearance, trachea midline. Jugular veins: No JVD noted. Respiratory:  Effort: Normal respiratory effort, comfortable, speaks in complete sentences. Musculoskeletal:  Gait/station: normal.  Digits/nails: Normal, no spooning of nails, clubbing, or splinter hemorrhages, no clubbing, cyanosis, petechiae or other inflammatory conditions. Psychiatric:  Judgment/insight: Normal, normal judgement, normal insight. Orientation: oriented to time, space and person. Lab Results:   No Electronic Results  Impressions:   Family history of colon cancer. Both parents a sister and the sister's son all . Plan:   Colonoscopy  Upper Endoscopy  Please arrange [facility] \"MyRisk\" genetic testing. Risk & Medical Necessity:    The patient requires Moderate Severity care for this visit. Diagnosis and management options are Minimal. The amount of data reviewed and/or ordered is Minimal/None. The level of risk is Minimal.       Manuela Andres MD    Electronically signed on 2019 11:39:08 AM by Lee Monterroso. MD Joss Alegria.  Rosanne Weber, MRN [id number],  1965 First Visit, Monday, May 6, 2019

## 2023-02-28 NOTE — ANESTHESIA POSTPROCEDURE EVALUATION
Procedure(s):  COLONOSCOPY  ENDOSCOPIC POLYPECTOMY.     MAC    Anesthesia Post Evaluation      Multimodal analgesia: multimodal analgesia not used between 6 hours prior to anesthesia start to PACU discharge  Patient location during evaluation: PACU  Patient participation: complete - patient participated  Level of consciousness: awake and awake and alert  Airway patency: patent  Anesthetic complications: no  Cardiovascular status: acceptable  Respiratory status: acceptable  Hydration status: acceptable  Post anesthesia nausea and vomiting:  none  Final Post Anesthesia Temperature Assessment:  Normothermia (36.0-37.5 degrees C)      INITIAL Post-op Vital signs:   Vitals Value Taken Time   /99 02/24/23 1105   Temp 36.5 °C (97.7 °F) 02/24/23 1055   Pulse 63 02/24/23 1105   Resp 19 02/24/23 1105   SpO2 99 % 02/24/23 1105

## (undated) DEVICE — CONTAINER SPEC 20 ML LID NEUT BUFF FORMALIN 10 % POLYPR STS

## (undated) DEVICE — ELECTRODE,RADIOTRANSLUCENT,FOAM,3PK: Brand: MEDLINE

## (undated) DEVICE — SOLIDIFIER MEDC 1200ML -- CONVERT TO 356117

## (undated) DEVICE — 1200 GUARD II KIT W/5MM TUBE W/O VAC TUBE: Brand: GUARDIAN

## (undated) DEVICE — CANN NASAL O2 CAPNOGRAPHY AD -- FILTERLINE

## (undated) DEVICE — CUFF RMFG BP INF SZ 11 DISP -- LAWSON OEM ITEM 238915

## (undated) DEVICE — BAG SPEC BIOHZRD 10 X 10 IN --

## (undated) DEVICE — ADULT SPO2 SENSOR: Brand: NELLCOR

## (undated) DEVICE — CANNULA CUSH AD W/ 14FT TBG

## (undated) DEVICE — CATH IV AUTOGRD BC BLU 22GA 25 -- INSYTE

## (undated) DEVICE — SET ADMIN 16ML TBNG L100IN 2 Y INJ SITE IV PIGGY BK DISP (ORDER IN MULIPLES OF 48)

## (undated) DEVICE — POLYP TRAP: Brand: TRAPEASE®

## (undated) DEVICE — KIT COLON W/ 1.1OZ LUB AND 2 END

## (undated) DEVICE — KIT COLON DUAL END BRSH W/LUBE -- CUSTOM BX/20 FORMERLY KS-18-20

## (undated) DEVICE — SNARE ENDOSCP M L240CM W27MM SHTH DIA2.4MM CHN 2.8MM OVL

## (undated) DEVICE — Device

## (undated) DEVICE — BAG BELONG PT PERS CLEAR HANDL

## (undated) DEVICE — SIMPLICITY FLUFF UNDERPAD 23X36, MODERATE: Brand: SIMPLICITY

## (undated) DEVICE — (D)SENSOR RMFG 02 PULS OXMTR -- DISC BY MFR USE ITEM 133445

## (undated) DEVICE — SET GRAV CK VLV NEEDLESS ST 3 GANGED 4WAY STPCOCK HI FLO 10

## (undated) DEVICE — SET IV ADMIN GRAVITY W/STPCOCK --

## (undated) DEVICE — BITEBLOCK ENDOSCP 60FR MAXI WHT POLYETH STURDY W/ VELC WVN

## (undated) DEVICE — 3M™ CUROS™ DISINFECTING CAP FOR NEEDLELESS CONNECTORS 270/CARTON 20 CARTONS/CASE CFF1-270: Brand: CUROS™

## (undated) DEVICE — KENDALL RADIOLUCENT FOAM MONITORING ELECTRODE -RECTANGULAR SHAPE: Brand: KENDALL